# Patient Record
Sex: FEMALE | Race: WHITE | Employment: FULL TIME | URBAN - METROPOLITAN AREA
[De-identification: names, ages, dates, MRNs, and addresses within clinical notes are randomized per-mention and may not be internally consistent; named-entity substitution may affect disease eponyms.]

---

## 2020-12-01 ENCOUNTER — HOSPITAL ENCOUNTER (EMERGENCY)
Age: 55
Discharge: HOME | End: 2020-12-01
Attending: EMERGENCY MEDICINE
Payer: COMMERCIAL

## 2020-12-01 ENCOUNTER — APPOINTMENT (OUTPATIENT)
Dept: GENERAL RADIOLOGY | Age: 55
End: 2020-12-01
Attending: EMERGENCY MEDICINE
Payer: COMMERCIAL

## 2020-12-01 VITALS
SYSTOLIC BLOOD PRESSURE: 128 MMHG | BODY MASS INDEX: 23.71 KG/M2 | RESPIRATION RATE: 16 BRPM | OXYGEN SATURATION: 100 % | DIASTOLIC BLOOD PRESSURE: 97 MMHG | TEMPERATURE: 98 F | HEART RATE: 77 BPM | HEIGHT: 64 IN | WEIGHT: 138.89 LBS

## 2020-12-01 DIAGNOSIS — S61.211A LACERATION OF LEFT INDEX FINGER WITHOUT FOREIGN BODY WITHOUT DAMAGE TO NAIL, INITIAL ENCOUNTER: Primary | ICD-10-CM

## 2020-12-01 PROCEDURE — 90715 TDAP VACCINE 7 YRS/> IM: CPT | Performed by: EMERGENCY MEDICINE

## 2020-12-01 PROCEDURE — 99283 EMERGENCY DEPT VISIT LOW MDM: CPT

## 2020-12-01 PROCEDURE — 74011250636 HC RX REV CODE- 250/636: Performed by: EMERGENCY MEDICINE

## 2020-12-01 PROCEDURE — 75810000293 HC SIMP/SUPERF WND  RPR

## 2020-12-01 PROCEDURE — 73140 X-RAY EXAM OF FINGER(S): CPT

## 2020-12-01 PROCEDURE — 90471 IMMUNIZATION ADMIN: CPT

## 2020-12-01 PROCEDURE — 74011000250 HC RX REV CODE- 250: Performed by: EMERGENCY MEDICINE

## 2020-12-01 RX ORDER — VALACYCLOVIR HYDROCHLORIDE 1 G/1
1000 TABLET, FILM COATED ORAL
COMMUNITY

## 2020-12-01 RX ORDER — BISMUTH SUBSALICYLATE 262 MG
1 TABLET,CHEWABLE ORAL DAILY
COMMUNITY

## 2020-12-01 RX ORDER — LIDOCAINE HYDROCHLORIDE 10 MG/ML
10 INJECTION INFILTRATION; PERINEURAL ONCE
Status: COMPLETED | OUTPATIENT
Start: 2020-12-01 | End: 2020-12-01

## 2020-12-01 RX ORDER — CEPHALEXIN 500 MG/1
500 CAPSULE ORAL 3 TIMES DAILY
Qty: 21 CAP | Refills: 0 | Status: SHIPPED | OUTPATIENT
Start: 2020-12-01 | End: 2020-12-08

## 2020-12-01 RX ORDER — BACITRACIN 500 UNIT/G
1 PACKET (EA) TOPICAL
Status: COMPLETED | OUTPATIENT
Start: 2020-12-01 | End: 2020-12-01

## 2020-12-01 RX ORDER — CEPHALEXIN 500 MG/1
500 CAPSULE ORAL 3 TIMES DAILY
Qty: 21 CAP | Refills: 0 | Status: SHIPPED | OUTPATIENT
Start: 2020-12-01 | End: 2020-12-01

## 2020-12-01 RX ADMIN — Medication 2 ML: at 10:00

## 2020-12-01 RX ADMIN — LIDOCAINE HYDROCHLORIDE 10 ML: 10 INJECTION, SOLUTION INFILTRATION; PERINEURAL at 10:41

## 2020-12-01 RX ADMIN — TETANUS TOXOID, REDUCED DIPHTHERIA TOXOID AND ACELLULAR PERTUSSIS VACCINE, ADSORBED 0.5 ML: 5; 2.5; 8; 8; 2.5 SUSPENSION INTRAMUSCULAR at 11:57

## 2020-12-01 RX ADMIN — Medication 2 ML: at 09:46

## 2020-12-01 RX ADMIN — BACITRACIN 1 PACKET: 500 OINTMENT TOPICAL at 11:57

## 2020-12-01 NOTE — ED PROVIDER NOTES
HPI       48y F here with L index finger laceration. Cut it yesterday around 11:30a with a xacto knife. Can still move the finger. Sensation ok. Continues to bleed. She is R-handed. Happened while hanging wallpaper. No other injuries or complaints. Last tetanus was 5-6 years ago. No past medical history on file. Past Surgical History:   Procedure Laterality Date    HX BREAST AUGMENTATION      HX  SECTION      x4    HX OTHER SURGICAL      tummy tuck         No family history on file. Social History     Socioeconomic History    Marital status: Not on file     Spouse name: Not on file    Number of children: Not on file    Years of education: Not on file    Highest education level: Not on file   Occupational History    Not on file   Social Needs    Financial resource strain: Not on file    Food insecurity     Worry: Not on file     Inability: Not on file    Transportation needs     Medical: Not on file     Non-medical: Not on file   Tobacco Use    Smoking status: Not on file   Substance and Sexual Activity    Alcohol use: Not on file    Drug use: Not on file    Sexual activity: Not on file   Lifestyle    Physical activity     Days per week: Not on file     Minutes per session: Not on file    Stress: Not on file   Relationships    Social connections     Talks on phone: Not on file     Gets together: Not on file     Attends Christianity service: Not on file     Active member of club or organization: Not on file     Attends meetings of clubs or organizations: Not on file     Relationship status: Not on file    Intimate partner violence     Fear of current or ex partner: Not on file     Emotionally abused: Not on file     Physically abused: Not on file     Forced sexual activity: Not on file   Other Topics Concern    Not on file   Social History Narrative    Not on file         ALLERGIES: Patient has no known allergies.     Review of Systems   Review of Systems   Constitutional: (-) weight loss.   HEENT: (-) stiff neck   Eyes: (-) discharge. Respiratory: (-) cough. Cardiovascular: (-) syncope. Gastrointestinal: (-) blood in stool. Genitourinary: (-) hematuria. Musculoskeletal: (-) myalgias. Neurological: (-) seizure. Skin: (-) petechiae  Lymph/Immunologic: (-) enlarged lymph nodes  All other systems reviewed and are negative. There were no vitals filed for this visit. Physical Exam Nursing note and vitals reviewed. Constitutional: oriented to person, place, and time. appears well-developed and well-nourished. No distress. Head: Normocephalic and atraumatic. Nose: No rhinorrhea  Mouth/Throat: Oropharynx is clear and moist.  Eyes: Conjunctivae are normal.  Neck: Painless normal range of motion. Cardiovascular: Normal rate  Pulmonary/Chest:  No respiratory distress  Extremities/Musculoskeletal: Normal range of motion. No tenderness. No edema. Distal extremities are neurovasc intact. Avulsion laceration of index finger below the DIP on the ant/lateral aspect. Neurological:  Alert and oriented to person, place, and time. Coordination normal. CN 2-12 intact. Motor and sensory function intact. Skin: Skin is warm and dry. No rash noted. No pallor. MDM 54y F here with finger laceration. Will clean and attempt to better visualize. 11:00 AM  Bleeding controlled after LET and area more numb. Was able to clean it out and visualize better. It was an upside down \"U\" shaped laceration that was 7cm in total length. Wound Repair    Date/Time: 12/1/2020 11:30 AM  Performed by: attendingPreparation: skin prepped with Mary Kate  Pre-procedure re-eval: Immediately prior to the procedure, the patient was reevaluated and found suitable for the planned procedure and any planned medications.   Location details: left index finger  Wound length:2.6 - 7.5 cm  Anesthesia: digital block and local infiltration    Anesthesia:  Local Anesthetic: lidocaine 1% without epinephrine and LET (lido,epi,tetracaine)  Anesthetic total: 7 mL  Foreign bodies: no foreign bodies  Irrigation solution: saline  Irrigation method: syringe  Debridement: none  Skin closure: 4-0 nylon, 5-0 nylon and Prolene  Number of sutures: 7  Technique: simple  Approximation: close  Dressing: antibiotic ointment  Patient tolerance: Patient tolerated the procedure well with no immediate complications  My total time at bedside, performing this procedure was 16-30 minutes. Comments: One 4-0 prolene placed as anchor at apex of laceration.  Six 5-0 prolene sutures placed along the rest of both edges

## 2020-12-01 NOTE — DISCHARGE INSTRUCTIONS
Patient Education     Return to the ED with any concerns - come back for fevers, increased pain at the laceration site, spreading redness of the skin around the cut, pus coming from the wound or if you feel worse in any way. Do not get the wound wet for 24 hours. After that, you can gently sponge the area clean. Use topical antibiotic ointment 2-3 times a day while the sutures are still in. The sutures can come out in 10-14 days. Either see your doctor or return here for removal.  After the sutures are out, use sunscreen daily for 12 months to help reduce scar formation. You can use over the counter  tylenol and motrin for pain. Cuts on the Hand Closed With Stitches: Care Instructions  Your Care Instructions     A cut on your hand can be on your fingers, your thumb, or the front or back of your hand. Sometimes a cut can injure the tendons, blood vessels, or nerves of your hand. The doctor used stitches to close the cut. Using stitches also helps the cut heal and reduces scarring. The doctor may have given you a splint to help prevent you from moving your hand, fingers, or thumb. If the cut went deep and through the skin, the doctor put in two layers of stitches. The deeper layer brings the deep part of the cut together. These stitches will dissolve and don't need to be removed. The stitches in the upper layer are the ones you see on the cut. You will probably have a bandage. You will need to have the stitches removed, usually in 7 to 14 days. The doctor may suggest that you see a hand specialist if the cut is very deep or if you have trouble moving your fingers or have less feeling in your hand. The doctor has checked you carefully, but problems can develop later. If you notice any problems or new symptoms, get medical treatment right away. Follow-up care is a key part of your treatment and safety. Be sure to make and go to all appointments, and call your doctor if you are having problems.  It's also a good idea to know your test results and keep a list of the medicines you take. How can you care for yourself at home? · Keep the cut dry for the first 24 to 48 hours. After this, you can shower if your doctor okays it. Pat the cut dry. · Don't soak the cut, such as in a bathtub. Your doctor will tell you when it's safe to get the cut wet. · If your doctor told you how to care for your cut, follow your doctor's instructions. If you did not get instructions, follow this general advice:  ? After the first 24 to 48 hours, wash around the cut with clean water 2 times a day. Don't use hydrogen peroxide or alcohol, which can slow healing. ? You may cover the cut with a thin layer of petroleum jelly, such as Vaseline, and a nonstick bandage. ? Apply more petroleum jelly and replace the bandage as needed. · Prop up the sore hand on a pillow anytime you sit or lie down during the next 3 days. Try to keep it above the level of your heart. This will help reduce swelling. · Avoid any activity that could cause your cut to reopen. · Do not remove the stitches on your own. Your doctor will tell you when to come back to have the stitches removed. · Be safe with medicines. Take pain medicines exactly as directed. ? If the doctor gave you a prescription medicine for pain, take it as prescribed. ? If you are not taking a prescription pain medicine, ask your doctor if you can take an over-the-counter medicine. When should you call for help? Call your doctor now or seek immediate medical care if:    · You have new pain, or your pain gets worse.     · The skin near the cut is cold or pale or changes color.     · You have tingling, weakness, or numbness near the cut.     · The cut starts to bleed, and blood soaks through the bandage.  Oozing small amounts of blood is normal.     · You have trouble moving the area of the hand near the cut.     · You have symptoms of infection, such as:  ? Increased pain, swelling, warmth, or redness around the cut.  ? Red streaks leading from the cut.  ? Pus draining from the cut.  ? A fever. Watch closely for changes in your health, and be sure to contact your doctor if:    · You do not get better as expected. Where can you learn more? Go to http://www.gray.com/  Enter T250 in the search box to learn more about \"Cuts on the Hand Closed With Stitches: Care Instructions. \"  Current as of: June 26, 2019               Content Version: 12.6  © 0583-2004 [employer]. Care instructions adapted under license by [employer] (which disclaims liability or warranty for this information). If you have questions about a medical condition or this instruction, always ask your healthcare professional. [address] any warranty or liability for your use of this information.

## 2020-12-01 NOTE — ED TRIAGE NOTES
Triage: pt was hanging wall paper yesterday around 1130 and cut 2nd digit of left hand with knife. Last tetanus 5 years ago.

## 2020-12-01 NOTE — ED NOTES
Pt ambulatory out of ED with discharge instructions and prescriptions in hand given by Dr. Lidia Mendoza; pt verbalized understanding of discharge paperwork and time allotted for questions. VSS. Pt alert and oriented.

## 2023-05-11 RX ORDER — VALACYCLOVIR HYDROCHLORIDE 1 G/1
TABLET, FILM COATED ORAL DAILY PRN
COMMUNITY

## 2023-12-11 ENCOUNTER — HOSPITAL ENCOUNTER (EMERGENCY)
Facility: HOSPITAL | Age: 58
Discharge: HOME | End: 2023-12-11
Attending: STUDENT IN AN ORGANIZED HEALTH CARE EDUCATION/TRAINING PROGRAM
Payer: COMMERCIAL

## 2023-12-11 VITALS
DIASTOLIC BLOOD PRESSURE: 95 MMHG | WEIGHT: 135.36 LBS | OXYGEN SATURATION: 99 % | HEIGHT: 64 IN | HEART RATE: 88 BPM | RESPIRATION RATE: 16 BRPM | SYSTOLIC BLOOD PRESSURE: 130 MMHG | BODY MASS INDEX: 23.11 KG/M2 | TEMPERATURE: 97.6 F

## 2023-12-11 DIAGNOSIS — S01.511A LACERATION OF VERMILION BORDER OF UPPER LIP, INITIAL ENCOUNTER: Primary | ICD-10-CM

## 2023-12-11 PROCEDURE — 2500000003 HC RX 250 WO HCPCS: Performed by: STUDENT IN AN ORGANIZED HEALTH CARE EDUCATION/TRAINING PROGRAM

## 2023-12-11 PROCEDURE — 6370000000 HC RX 637 (ALT 250 FOR IP): Performed by: FAMILY MEDICINE

## 2023-12-11 PROCEDURE — 12011 RPR F/E/E/N/L/M 2.5 CM/<: CPT

## 2023-12-11 PROCEDURE — 99283 EMERGENCY DEPT VISIT LOW MDM: CPT

## 2023-12-11 RX ORDER — LIDOCAINE HYDROCHLORIDE AND EPINEPHRINE 10; 10 MG/ML; UG/ML
20 INJECTION, SOLUTION INFILTRATION; PERINEURAL
Status: COMPLETED | OUTPATIENT
Start: 2023-12-11 | End: 2023-12-11

## 2023-12-11 RX ORDER — LIDOCAINE HYDROCHLORIDE 10 MG/ML
5 INJECTION, SOLUTION EPIDURAL; INFILTRATION; INTRACAUDAL; PERINEURAL
Status: DISCONTINUED | OUTPATIENT
Start: 2023-12-11 | End: 2023-12-11

## 2023-12-11 RX ORDER — GLUCOSAMINE SULFATE 500 MG
1 CAPSULE ORAL DAILY
COMMUNITY

## 2023-12-11 RX ORDER — CALCIUM CARBONATE 500(1250)
500 TABLET ORAL DAILY
COMMUNITY

## 2023-12-11 RX ORDER — RIBOFLAVIN (VITAMIN B2) 100 MG
100 TABLET ORAL DAILY
COMMUNITY

## 2023-12-11 RX ADMIN — LIDOCAINE HYDROCHLORIDE,EPINEPHRINE BITARTRATE 20 ML: 10; .01 INJECTION, SOLUTION INFILTRATION; PERINEURAL at 14:26

## 2023-12-11 RX ADMIN — Medication 3 ML: at 14:23

## 2023-12-11 ASSESSMENT — ENCOUNTER SYMPTOMS
VOMITING: 0
NAUSEA: 0
SHORTNESS OF BREATH: 0
COUGH: 0
BACK PAIN: 0
ABDOMINAL PAIN: 0

## 2023-12-11 ASSESSMENT — LIFESTYLE VARIABLES
HOW OFTEN DO YOU HAVE A DRINK CONTAINING ALCOHOL: 2-3 TIMES A WEEK
HOW MANY STANDARD DRINKS CONTAINING ALCOHOL DO YOU HAVE ON A TYPICAL DAY: 1 OR 2

## 2023-12-11 ASSESSMENT — PAIN - FUNCTIONAL ASSESSMENT: PAIN_FUNCTIONAL_ASSESSMENT: NONE - DENIES PAIN

## 2023-12-11 NOTE — DISCHARGE INSTRUCTIONS
You presented to ED with laceration of upper lip, laceration was near midline but more on the left side. 3 absorbable sutures were placed in your lip, 4 nonabsorbable sutures were placed from the vermilion border. These were removed in 5 to 7 days. Apply bacitracin to wound once or twice a day. Keep covered to prevent scarring. If symptoms change or worsen follow-up with your PCP. Nerve block was done for the upper lip and face. This wore off and several hours.

## 2023-12-11 NOTE — ED TRIAGE NOTES
Patient reports that she stepped on her tape measure and tripped, hitting her face on a metal baby gate. Patient denies LOC but reports that her upper lip split open. Pressure bandage applied PTA. Bleeding controlled. Patient AAO x4 with steady gait.

## 2023-12-13 NOTE — ED NOTES
Discharge instructions reviewed with pt and copy given along with RX by this RN. Patient educated on follow up with PCP and also verbalized understanding of having sutures removed in the next 5-7 days. Pt ambulatory from ED in no sign of distress or discomfort.         Sri Guadalupe  12/11/23 0242
recommendations for outpatient care as below. Key discharge instructions and summary of care provided in AVS: You presented to ED with laceration of upper lip, laceration was near midline but more on the left side. 3 absorbable sutures were placed in your lip, 4 nonabsorbable sutures were placed from the vermilion border. These were removed in 5 to 7 days. Apply bacitracin to wound once or twice a day. Keep covered to prevent scarring. If symptoms change or worsen follow-up with your PCP. Nerve block was done for the upper lip and face. This wore off and several hours. The patient has been re-evaluated and feeling better. Patient is stable for discharge. All available radiology and laboratory results have been reviewed with patient and/or available family. Patient and/or family verbally conveyed their understanding and agreement of the patient's signs, symptoms, diagnosis, treatment and prognosis and additionally agree to follow-up as recommended in the discharge instructions or to return to the Emergency Department should their condition change or worsen prior to their follow-up appointment. All questions have been answered and patient and/or available family express understanding.       Shira Gupta MD   Emergency Medicine Attending Physician          Shira Gupta MD  12/13/23 [id number]